# Patient Record
(demographics unavailable — no encounter records)

---

## 2025-07-01 NOTE — ASSESSMENT
[FreeTextEntry1] : Urinary pressure and discomfort with blood in the urine.  CAT scan results unknown

## 2025-07-01 NOTE — HISTORY OF PRESENT ILLNESS
[FreeTextEntry1] : This patient presents today stating that for the past month she has had pelvic pressure and burning along with abdominal discomfort.  She is seen several healthcare institutions and states she had a CAT scan done at [facility].  Today her urine residual was 100 cc although she could not void.  Upon voiding her urine is positive only for blood and no evidence for leukocyte esterase or nitrites.  She denies a history of

## 2025-07-01 NOTE — REVIEW OF SYSTEMS
[Heartburn] : heartburn [Urine Infection (bladder/kidney)] : bladder/kidney infection [Pain during urination] : pain during urination [Bladder pressure] : experiences bladder pressure [Wait a long time to urinate] : waits a long time to urinate [Slow urine stream] : slow urine stream [Anxiety] : anxiety [Depression] : depression [see HPI] : see HPI [Negative] : Heme/Lymph

## 2025-07-01 NOTE — END OF VISIT
[FreeTextEntry3] : On the outside chance that it is a vaginitis I called and metronidazole and fluconazole.  We will obtain her CAT scan result from [facility] tomorrow and her urine is sent for culture analysis cytology with further plans to follow-up

## 2025-07-01 NOTE — HISTORY OF PRESENT ILLNESS
[FreeTextEntry1] : This patient presents today stating that for the past month she has had pelvic pressure and burning along with abdominal discomfort.  She is seen several healthcare institutions and states she had a CAT scan done at [facility].  Today her urine residual was 100 cc although she could not void.  Upon voiding her urine is positive only for blood and no evidence for leukocyte esterase or nitrites.  She denies a history of